# Patient Record
Sex: FEMALE
[De-identification: names, ages, dates, MRNs, and addresses within clinical notes are randomized per-mention and may not be internally consistent; named-entity substitution may affect disease eponyms.]

---

## 2020-11-14 ENCOUNTER — HOSPITAL ENCOUNTER (EMERGENCY)
Dept: HOSPITAL 97 - ER | Age: 45
Discharge: HOME | End: 2020-11-14
Payer: SELF-PAY

## 2020-11-14 VITALS — OXYGEN SATURATION: 99 % | TEMPERATURE: 98.8 F

## 2020-11-14 VITALS — SYSTOLIC BLOOD PRESSURE: 118 MMHG | DIASTOLIC BLOOD PRESSURE: 72 MMHG

## 2020-11-14 DIAGNOSIS — K04.7: Primary | ICD-10-CM

## 2020-11-14 DIAGNOSIS — Z72.0: ICD-10-CM

## 2020-11-14 PROCEDURE — 99283 EMERGENCY DEPT VISIT LOW MDM: CPT

## 2020-11-14 PROCEDURE — 0C94XZZ DRAINAGE OF BUCCAL MUCOSA, EXTERNAL APPROACH: ICD-10-PCS

## 2020-11-14 PROCEDURE — 96365 THER/PROPH/DIAG IV INF INIT: CPT

## 2020-11-14 NOTE — EDPHYS
Physician Documentation                                                                           

 [facility]                                                                 

Name: Slime Ortega                                                                             

Age: 45 yrs                                                                                       

Sex: Female                                                                                       

: 1975                                                                                   

MRN: [id number]                                                                                   

Arrival Date: 2020                                                                          

Time: 14:21                                                                                       

Account#: [id number]                                                                            

Bed 24                                                                                            

Private MD:                                                                                       

ED Physician Yao Turner                                                                         

HPI:                                                                                              

                                                                                             

15:05 This 45 yrs old  Female presents to ER via Ambulatory with complaints of        cp  

      Toothache, Facial Swelling.                                                                 

15:05 The patient presents with pain, swelling. The problem is located in the left lower jaw. cp  

15:05 Onset: The symptoms/episode began/occurred yesterday. Duration: The symptoms are        cp  

      continuous, and are steadily getting worse.                                                 

15:05 Associated signs and symptoms: Pertinent positives: swelling, mandibular, Pertinent     cp  

      negatives: chills, dysphagia, fever, inability to eat, vomiting. Severity of symptoms:      

      in the emergency department the symptoms are unchanged, despite home interventions.         

                                                                                                  

OB/GYN:                                                                                           

14:45 LMP N/A - Post-menopause                                                                jd3 

                                                                                                  

Historical:                                                                                       

- Allergies:                                                                                      

14:45 No Known Allergies;                                                                     jd3 

- Home Meds:                                                                                      

14:45 None [Active];                                                                          jd3 

- PMHx:                                                                                           

14:45 None;                                                                                   jd3 

- PSHx:                                                                                           

14:45 Cholecystectomy;                                                                        jd3 

                                                                                                  

- Immunization history:: Adult Immunizations up to date.                                          

- Social history:: Smoking status: Patient reports the use of cigarette tobacco                   

  products, denies chronic smoking, but will smoke occasionally.                                  

                                                                                                  

                                                                                                  

ROS:                                                                                              

15:10 Constitutional: Negative for body aches, chills, fever, poor PO intake.                 cp  

15:10 Eyes: Negative for injury, pain, redness, and discharge.                                cp  

15:10 ENT: Positive for dental pain, left lower jaw pain, Negative for drainage from ear(s),      

      ear pain, sore throat, difficulty swallowing, difficulty handling secretions.               

15:10 Cardiovascular: Negative for chest pain, palpitations.                                      

15:10 Respiratory: Negative for cough, shortness of breath, wheezing.                             

15:10 Abdomen/GI: Negative for abdominal pain, nausea, vomiting, and diarrhea.                    

15:10 Skin: Negative for rash.                                                                    

15:10 Neuro: Negative for altered mental status, headache, weakness.                              

15:10 All other systems are negative.                                                             

                                                                                                  

Exam:                                                                                             

15:25 Constitutional: The patient appears in no acute distress, alert, awake, non-toxic, well cp  

      developed, well nourished, uncomfortable.                                                   

15:25 Head/face: Noted is no obvious of injury or deformity except swelling, that is mild, of cp  

      the  left jaw, tenderness, that is severe, of the  left jaw.                                

15:25 Eyes: Periorbital structures: appear normal, Conjunctiva: normal, no exudate, no            

      injection, Sclera: no appreciated abnormality, Lids and lashes: appear normal,              

      bilaterally.                                                                                

15:25 ENT: External ear(s): are unremarkable, Nose: is normal, Mouth: Lips: moist, Oral           

      mucosa: pink and intact, moist, Posterior pharynx: Airway: no evidence of obstruction,      

      patent, Dental exam: abscess, that is mild, specifically in the  lower left first molar     

      (#19) and lower left second bicuspid (#20), dental caries, that is severe, specifically     

      in the  lower left first molar (#19) and lower left second bicuspid (#20), fractured        

      teeth are noted, specifically the  lower left first molar (#19) and lower left second       

      bicuspid (#20), pain, that is severe, specifically in the  lower left first molar (#19)     

      and lower left second bicuspid (#20), Voice: is normal, negative trismus.                   

15:25 Neck: ROM/movement: is normal, is supple, without pain, no range of motions                 

      limitations, no nuchal rigidity.                                                            

15:25 Chest/axilla: Inspection: normal, Palpation: is normal, no crepitus, no tenderness.         

15:25 Cardiovascular: Rate: normal.                                                               

15:25 Respiratory: the patient does not display signs of respiratory distress,  Respirations:     

      normal.                                                                                     

                                                                                                  

Vital Signs:                                                                                      

14:45  / 75; Pulse 81; Resp 17 S; Temp 98.8(TE); Pulse Ox 99% on R/A; Pain 10/10;       jd3 

14:48 Weight 55.25 kg (M);                                                                    aa5 

15:35  / 72; Pulse 80; Resp 16 S; Pulse Ox 99% on R/A;                                  aa5 

                                                                                                  

Procedures:                                                                                       

23:50 I \T\ D: Incision and drainage was performed for an abscess of the lower left first molar cp

      (#19) Anesthetized with left mandibular block with 5 ccs of 1% lidocaine w/o epi and        

      0.5% marcaine. Drained small amount purulent fluid. area aspirated with 18 gauge needle.    

                                                                                                  

MDM:                                                                                              

14:48 Patient medically screened.                                                             cp  

16:00 Differential diagnosis: dental caries, gingivitis, dental abscess, pericoronitis.       cp  

16:42 Data reviewed: vital signs, nurses notes, and as a result, I will discharge patient.      

16:42 Counseling: I had a detailed discussion with the patient and/or guardian regarding: the cp  

      historical points, exam findings, and any diagnostic results supporting the                 

      discharge/admit diagnosis, the need for outpatient follow up, for definitive care, a        

      dentist, to return to the emergency department if symptoms worsen or persist or if          

      there are any questions or concerns that arise at home. Response to treatment: the          

      patient's symptoms have markedly improved after treatment, and as a result, I will          

      discharge patient.                                                                          

                                                                                                  

11                                                                                             

15:00 Order name: IV; Complete Time: 15:34                                                      

11                                                                                             

15:00 Order name: I\T\D Setup; Complete Time: 15:34                                             cp

                                                                                                  

Administered Medications:                                                                         

15:34 Drug: Clindamycin 600 mg Route: IVPB; Infused Over: 30 mins; Site: right forearm;       aa5 

16:04 Follow up: Response: No adverse reaction; IV Status: Completed infusion                 aa5 

16:30 Drug: Marcaine (0.5 %) 5 ml {Note: administered for I\T\D by PA.} Volume: 10 ml; Route:   aa5

      Infiltration;                                                                               

16:30 Drug: Lidocaine (1 %) 5 ml {Note: administered for I\T\D by PA.} Volume: 5 ml; Route:     aa5

      Infiltration;                                                                               

                                                                                                  

                                                                                                  

Disposition:                                                                                      

16:50 Chart complete.                                                                           

11/15                                                                                             

07:04 Co-signature as Attending Physician, Yao Turner MD.                                    rn  

                                                                                                  

Disposition:                                                                                      

20 16:43 Discharged to Home. Impression: Other specified disorders of teeth and             

  supporting structures.                                                                          

- Condition is Stable.                                                                            

- Discharge Instructions: Dental Abscess, Dental Pain.                                            

- Prescriptions for Clindamycin HCl 300 mg Oral Capsule - take 1 capsule by ORAL route            

  every 6 hours for 10 days; 40 capsule. Tylenol- Codeine #3 300-30 mg Oral Tablet -              

  take 2 tablets by ORAL route every 6 hours As needed; 20 tablet.                                

- Medication Reconciliation Form, Thank You Letter, Antibiotic Education, Prescription            

  Opioid Use form.                                                                                

- Follow up: Private Physician; When: 1 - 2 days; Reason: Recheck today's complaints.             

- Problem is new.                                                                                 

- Symptoms have improved.                                                                         

                                                                                                  

                                                                                                  

                                                                                                  

Signatures:                                                                                       

Yao Turner MD MD rn Calderon, Audri, RN                     RN   aa5                                                  

Nick Moreira PA PA cp Davies, Jonathon RN                    RN   jd3                                                  

                                                                                                  

Corrections: (The following items were deleted from the chart)                                    

                                                                                             

17:23 16:43 2020 16:43 Discharged to Home. Impression: Other specified disorders of     aa5 

      teeth and supporting structures. Condition is Stable. Forms are Medication                  

      Reconciliation Form, Thank You Letter, Antibiotic Education, Prescription Opioid Use.       

      Follow up: Private Physician; When: 1 - 2 days; Reason: Recheck today's complaints.         

      Problem is new. Symptoms have improved. cp                                                  

                                                                                                  

**************************************************************************************************

## 2020-11-14 NOTE — ER
Nurse's Notes                                                                                     

 [facility]                                                                 

Name: Slime Ortega                                                                             

Age: 45 yrs                                                                                       

Sex: Female                                                                                       

: 1975                                                                                   

MRN: [id number]                                                                                   

Arrival Date: 2020                                                                          

Time: 14:21                                                                                       

Account#: [id number]                                                                            

Bed 24                                                                                            

Private MD:                                                                                       

Diagnosis: Other specified disorders of teeth and supporting structures                           

                                                                                                  

Presentation:                                                                                     

                                                                                             

14:44 Chief complaint: Patient states: "the left side of my face is swollen and hurting.".    jd3 

      Coronavirus screen: At this time, the client does not indicate any symptoms associated      

      with coronavirus-19. Ebola Screen: Patient negative for fever greater than or equal to      

      101.5 degrees Fahrenheit, and additional compatible Ebola Virus Disease symptoms.           

      Initial Sepsis Screen: Does the patient meet any 2 criteria? No. Patient's initial          

      sepsis screen is negative. Does the patient have a suspected source of infection? No.       

      Patient's initial sepsis screen is negative. Risk Assessment: Do you want to hurt           

      yourself or someone else? Patient reports no desire to harm self or others. Onset of        

      symptoms was 2020.                                                             

14:44 Method Of Arrival: Ambulatory                                                           jd3 

14:44 Acuity: RENEE 4                                                                           jd3 

                                                                                                  

OB/GYN:                                                                                           

14:45 LMP N/A - Post-menopause                                                                jd3 

                                                                                                  

Historical:                                                                                       

- Allergies:                                                                                      

14:45 No Known Allergies;                                                                     jd3 

- Home Meds:                                                                                      

14:45 None [Active];                                                                          jd3 

- PMHx:                                                                                           

14:45 None;                                                                                   jd3 

- PSHx:                                                                                           

14:45 Cholecystectomy;                                                                        jd3 

                                                                                                  

- Immunization history:: Adult Immunizations up to date.                                          

- Social history:: Smoking status: Patient reports the use of cigarette tobacco                   

  products, denies chronic smoking, but will smoke occasionally.                                  

                                                                                                  

                                                                                                  

Screenin:50 Abuse screen: Denies threats or abuse. Nutritional screening: No deficits noted.        aa5 

      Tuberculosis screening: No symptoms or risk factors identified. Fall Risk None              

      identified.                                                                                 

                                                                                                  

Assessment:                                                                                       

14:48 General: Appears uncomfortable, Behavior is calm, cooperative. Pain: Complains of pain  aa5 

      in left jaw Pain currently is 10 out of 10 on a pain scale. Quality of pain is              

      described as aching, throbbing, Pain began 1 day ago. Is continuous, Aggravated by          

      eating. Neuro: Level of Consciousness is awake, alert, obeys commands, Oriented to          

      person, place, time, situation. Cardiovascular: Patient's skin is warm and dry.             

      Respiratory: Airway is patent Respiratory effort is even, unlabored, Respiratory            

      pattern is regular, symmetrical. GI: No signs and/or symptoms were reported involving       

      the gastrointestinal system. : No signs and/or symptoms were reported regarding the       

      genitourinary system. EENT: Reports toothache. Derm: Skin is pink, warm \T\ dry.            

      Musculoskeletal: Range of motion: intact in all extremities.                                

15:35 Reassessment: Patient is alert, oriented x 3, equal unlabored respirations, skin        aa5 

      warm/dry/pink. Awaiting I\T\D.                                                              

16:30 Reassessment: Patient is alert, oriented x 3, equal unlabored respirations, skin        aa5 

      warm/dry/pink. PA at bedside for I\T\D.                                                     

17:15 Reassessment: Patient is alert, oriented x 3, equal unlabored respirations, skin        aa5 

      warm/dry/pink.                                                                              

                                                                                                  

Vital Signs:                                                                                      

14:45  / 75; Pulse 81; Resp 17 S; Temp 98.8(TE); Pulse Ox 99% on R/A; Pain 10/10;       jd3 

14:48 Weight 55.25 kg (M);                                                                    aa5 

15:35  / 72; Pulse 80; Resp 16 S; Pulse Ox 99% on R/A;                                  aa5 

                                                                                                  

ED Course:                                                                                        

14:21 Patient arrived in ED.                                                                  as  

14:44 Triage completed.                                                                       jd3 

14:46 Arm band placed on.                                                                     jd3 

14:47 Nick Moreira PA is PHCP.                                                                cp  

14:47 Yao Turner MD is Attending Physician.                                                cp  

14:48 Macarena Briones, RN is Primary Nurse.                                                   aa5 

14:48 Patient has correct armband on for positive identification. Bed in low position. Call   aa5 

      light in reach. Side rails up X 1.                                                          

15:33 Inserted saline lock: 20 gauge in right forearm, using aseptic technique.               aa5 

17:15 No provider procedures requiring assistance completed. IV discontinued, intact,         aa5 

      bleeding controlled, No redness/swelling at site. Pressure dressing applied.                

                                                                                                  

Administered Medications:                                                                         

15:34 Drug: Clindamycin 600 mg Route: IVPB; Infused Over: 30 mins; Site: right forearm;       aa5 

16:04 Follow up: Response: No adverse reaction; IV Status: Completed infusion                 aa5 

16:30 Drug: Marcaine (0.5 %) 5 ml {Note: administered for I\T\D by PA.} Volume: 10 ml; Route:   aa5

      Infiltration;                                                                               

16:30 Drug: Lidocaine (1 %) 5 ml {Note: administered for I\T\D by PA.} Volume: 5 ml; Route:     aa5

      Infiltration;                                                                               

                                                                                                  

                                                                                                  

Outcome:                                                                                          

16:43 Discharge ordered by MD.                                                                [place]  

17:15 Discharged to home ambulatory.                                                          aa5 

17:15 Condition: stable                                                                           

17:15 Discharge instructions given to patient, Instructed on discharge instructions, follow       

      up and referral plans. medication usage, Demonstrated understanding of instructions,        

      follow-up care, medications, Prescriptions given X 2.                                       

17:20 Patient left the ED.                                                                    aa5 

                                                                                                  

Signatures:                                                                                       

Celia Simmons Audri, RN                     RN   aa5                                                  

Nick Moreira PA PA cp Davies, Jonathon, RN                    RN   jd3                                                  

                                                                                                  

Corrections: (The following items were deleted from the chart)                                    

17:51 17:23 Patient left the ED. aa5                                                          aa5 

                                                                                                  

**************************************************************************************************

## 2020-11-15 ENCOUNTER — HOSPITAL ENCOUNTER (EMERGENCY)
Dept: HOSPITAL 97 - ER | Age: 45
LOS: 1 days | Discharge: HOME | End: 2020-11-16
Payer: SELF-PAY

## 2020-11-15 DIAGNOSIS — R07.9: ICD-10-CM

## 2020-11-15 DIAGNOSIS — R10.13: Primary | ICD-10-CM

## 2020-11-15 LAB
ALBUMIN SERPL BCP-MCNC: 3.6 G/DL (ref 3.4–5)
ALP SERPL-CCNC: 94 U/L (ref 45–117)
ALT SERPL W P-5'-P-CCNC: 21 U/L (ref 12–78)
AST SERPL W P-5'-P-CCNC: 30 U/L (ref 15–37)
BUN BLD-MCNC: 11 MG/DL (ref 7–18)
GLUCOSE SERPLBLD-MCNC: 167 MG/DL (ref 74–106)
HCT VFR BLD CALC: 37.3 % (ref 36–45)
INR BLD: 0.97
LIPASE SERPL-CCNC: 161 U/L (ref 73–393)
LYMPHOCYTES # SPEC AUTO: 4.1 K/UL (ref 0.7–4.9)
MAGNESIUM SERPL-MCNC: 2.1 MG/DL (ref 1.8–2.4)
NT-PROBNP SERPL-MCNC: 10 PG/ML (ref ?–125)
PMV BLD: 8.4 FL (ref 7.6–11.3)
POTASSIUM SERPL-SCNC: 3.8 MMOL/L (ref 3.5–5.1)
RBC # BLD: 4.04 M/UL (ref 3.86–4.86)
TROPONIN (EMERG DEPT USE ONLY): < 0.02 NG/ML (ref 0–0.04)

## 2020-11-15 PROCEDURE — 96374 THER/PROPH/DIAG INJ IV PUSH: CPT

## 2020-11-15 PROCEDURE — 85610 PROTHROMBIN TIME: CPT

## 2020-11-15 PROCEDURE — 74177 CT ABD & PELVIS W/CONTRAST: CPT

## 2020-11-15 PROCEDURE — 81003 URINALYSIS AUTO W/O SCOPE: CPT

## 2020-11-15 PROCEDURE — 99284 EMERGENCY DEPT VISIT MOD MDM: CPT

## 2020-11-15 PROCEDURE — 84484 ASSAY OF TROPONIN QUANT: CPT

## 2020-11-15 PROCEDURE — 71275 CT ANGIOGRAPHY CHEST: CPT

## 2020-11-15 PROCEDURE — 71045 X-RAY EXAM CHEST 1 VIEW: CPT

## 2020-11-15 PROCEDURE — 83735 ASSAY OF MAGNESIUM: CPT

## 2020-11-15 PROCEDURE — 83880 ASSAY OF NATRIURETIC PEPTIDE: CPT

## 2020-11-15 PROCEDURE — 82550 ASSAY OF CK (CPK): CPT

## 2020-11-15 PROCEDURE — 82947 ASSAY GLUCOSE BLOOD QUANT: CPT

## 2020-11-15 PROCEDURE — 85025 COMPLETE CBC W/AUTO DIFF WBC: CPT

## 2020-11-15 PROCEDURE — 80320 DRUG SCREEN QUANTALCOHOLS: CPT

## 2020-11-15 PROCEDURE — 80076 HEPATIC FUNCTION PANEL: CPT

## 2020-11-15 PROCEDURE — 81025 URINE PREGNANCY TEST: CPT

## 2020-11-15 PROCEDURE — 70450 CT HEAD/BRAIN W/O DYE: CPT

## 2020-11-15 PROCEDURE — 96361 HYDRATE IV INFUSION ADD-ON: CPT

## 2020-11-15 PROCEDURE — 36415 COLL VENOUS BLD VENIPUNCTURE: CPT

## 2020-11-15 PROCEDURE — 83690 ASSAY OF LIPASE: CPT

## 2020-11-15 PROCEDURE — 93005 ELECTROCARDIOGRAM TRACING: CPT

## 2020-11-15 PROCEDURE — 80048 BASIC METABOLIC PNL TOTAL CA: CPT

## 2020-11-15 PROCEDURE — 80307 DRUG TEST PRSMV CHEM ANLYZR: CPT

## 2020-11-16 VITALS — SYSTOLIC BLOOD PRESSURE: 97 MMHG | OXYGEN SATURATION: 99 % | DIASTOLIC BLOOD PRESSURE: 64 MMHG

## 2020-11-16 VITALS — TEMPERATURE: 97.9 F

## 2020-11-16 LAB
METHAMPHET UR QL SCN: NEGATIVE
THC SERPL-MCNC: NEGATIVE NG/ML

## 2020-11-16 NOTE — RAD REPORT
EXAM DESCRIPTION:  CT - Head Brain Wo Cont - 11/16/2020 3:38 am

 

CLINICAL HISTORY:  SYNCOPE

 

COMPARISON:  None.

 

TECHNIQUE:  CT HEAD WITHOUT IV CONTRAST on 11/15/2020 10:38 PM CST

This exam was performed according to our departmental dose-optimization program, which includes autom
ated exposure control, adjustment of the mA and/or kV according to patient size and/or use of iterati
ve reconstruction technique.

 

FINDINGS:  There is no acute hemorrhage, mass effect or midline shift. Gray-white differentiation is 
preserved. There is no hydrocephalus. There is no significant volume loss for age.

The calvarium is intact. Orbits and globes are unremarkable. The paranasal sinuses are clear. Mastoid
 air cells are clear.

 

IMPRESSION:  No acute intracranial findings.

 

Electronically signed by:   Jared Birmingham MD   11/16/2020 12:06 AM CST Workstation: [id number]

 

 

 

Due to temporary technical issues with the PACS/Fluency reporting system, reports are being signed by
 the in house radiologist without review as a courtesy to ensure prompt reporting. The interpreting r
adiologist is fully responsible for the content of the report.

## 2020-11-16 NOTE — RAD REPORT
EXAM DESCRIPTION:  Rajat Single View11/15/2020 11:15 pm

 

CLINICAL HISTORY:  Chest pain

 

COMPARISON:  2017

 

FINDINGS:   The lungs appear clear of acute infiltrate. The heart is normal size

 

IMPRESSION:   No acute abnormalities displayed

## 2020-11-16 NOTE — ER
Nurse's Notes                                                                                     

 [facility]                                                                 

Name: Slime Ortega                                                                             

Age: 45 yrs                                                                                       

Sex: Female                                                                                       

: 1975                                                                                   

MRN: [id number]                                                                                   

Arrival Date: 11/15/2020                                                                          

Time: 21:41                                                                                       

Account#: [id number]                                                                            

Bed 4                                                                                             

Private MD:                                                                                       

Diagnosis: Near Syncope;Abdominal Pain;Chest Pain                                                 

                                                                                                  

Presentation:                                                                                     

11/15                                                                                             

21:41 Chief complaint: Patient's son or daughter states: She was at home with some              

      family/friends and they said she almost passed out for few minutes, just wasn't able to     

      stand. She is really acting normal and she just says that shes having pain, abdominal       

      pain and near her chest is having pain, is what she says. Coronavirus screen: Client        

      denies travel out of the [place] in the last 14 days. At this time, the client does not        

      indicate any symptoms associated with coronavirus-19. Ebola Screen: Patient negative        

      for fever greater than or equal to 101.5 degrees Fahrenheit, and additional compatible      

      Ebola Virus Disease symptoms Patient denies exposure to infectious person. Patient          

      denies travel to an Ebola-affected area in the 21 days before illness onset. No             

      symptoms or risks identified at this time. Initial Sepsis Screen: Does the patient meet     

      any 2 criteria? No. Patient's initial sepsis screen is negative. Does the patient have      

      a suspected source of infection? No. Patient's initial sepsis screen is negative. Risk      

      Assessment: Do you want to hurt yourself or someone else? Patient reports no desire to      

      harm self or others. Onset of symptoms was November 15, 2020. Care prior to arrival:        

      None. Transition of care: patient was not received from another setting of care.            

21:41 Acuity: RENEE 3                                                                           sg  

21:41 Method Of Arrival: Wheelchair                                                           sg  

                                                                                                  

OB/GYN:                                                                                           

21:49 LMP N/A - Post-menopause                                                                lp1 

                                                                                                  

Historical:                                                                                       

- Allergies:                                                                                      

21:41 No Known Allergies;                                                                     sg  

- Home Meds:                                                                                      

21:49 None [Active];                                                                          lp1 

- PMHx:                                                                                           

21:46 None;                                                                                   sg  

- PSHx:                                                                                           

21:41 Cholecystectomy;                                                                        sg  

                                                                                                  

- Immunization history:: Adult Immunizations up to date.                                          

- Social history:: Smoking status: Patient denies any tobacco usage or history of.                

                                                                                                  

                                                                                                  

Screenin:49 Abuse screen: Denies threats or abuse. Denies injuries from another. Nutritional        lp1 

      screening: No deficits noted. Tuberculosis screening: No symptoms or risk factors           

      identified.                                                                                 

21:51 Fall Risk IV access (20 points).                                                        mg2 

                                                                                                  

Assessment:                                                                                       

21:46 General: Appears uncomfortable, Behavior is anxious, inappropriate for age. Pain:       lp1 

      Complains of pain in abdomen Pain currently is 10 out of 10 on a pain scale. Noted to       

      be crying, grimacing, guarding, moaning. Neuro: Level of Consciousness is obeys             

      commands, Oriented to person, place, Patient keeping eyes closed, hyperventilating,         

      answering questions by nodding head. Cardiovascular: Patient's skin is warm and dry.        

      Respiratory: Respiratory effort is even, Respiratory pattern is hyperventilation. GI:       

      Abdomen is non-distended, Abdomen is tender to palpation X 4 quads. Guarding noted X 4      

      quads. : No signs and/or symptoms were reported regarding the genitourinary system.       

      EENT: No signs and/or symptoms were reported regarding the EENT system. Derm: Skin is       

      pink, warm \T\ dry. Musculoskeletal: No deficits noted.                                     

22:00 Reassessment: Verbal order from provider for Ativan 0.5mg IV now; Patient noted to be   lp1 

      moaning, reports pain to abdomen, hyperventilating.                                         

22:37 Reassessment: Patient appears in no apparent distress at this time. Patient resting,    lp1 

      eyes closed, respirations unlabored.                                                        

23:17 Reassessment: patient sent to ct scan via stretcher.                                    mg2 

                                                                                             

01:22 Reassessment: Patient appears in no apparent distress at this time. Patient and/or      mg2 

      family updated on plan of care and expected duration. Pain level reassessed. Patient is     

      alert, oriented x 3, equal unlabored respirations, skin warm/dry/pink.                      

02:12 Reassessment: Patient is alert, oriented x 3, equal unlabored respirations, skin        mg2 

      warm/dry/pink.                                                                              

                                                                                                  

Vital Signs:                                                                                      

11/15                                                                                             

21:46  / 87; Pulse 96; Resp 24; Temp 97.9(TE); Pulse Ox 98% on R/A; Weight 54.88 kg     lp1 

      (R); Pain 10/10;                                                                            

22:30  / 78; Pulse 73; Resp 17; Pulse Ox 100% on R/A;                                   lp1 

23:30  / 76; Pulse 81; Resp 20; Pulse Ox 100% on R/A;                                   lp1 

                                                                                             

01:22  / 62; Pulse 72; Resp 18; Pulse Ox 97% on R/A;                                    mg2 

01:49 BP 97 / 64; Pulse 68; Resp 18; Pulse Ox 99% on R/A;                                     mg2 

                                                                                                  

ED Course:                                                                                        

11/15                                                                                             

21:41 Patient arrived in ED.                                                                  sg  

21:42 Js Balderrama MD is Attending Physician.                                             7 

21:42 Arm band placed on.                                                                     sg  

21:43 Triage completed.                                                                       sg  

21:44 Yolanda Ames, RN is Primary Nurse.                                                       lp1 

21:48 Patient has correct armband on for positive identification. Placed in gown. Side rails  lp1 

      up X2. Cardiac monitor on. Pulse ox on. NIBP on.                                            

21:49 Inserted saline lock: 20 gauge in right antecubital area, using aseptic technique. By   lp1 

      ISIS Hurtado tech.                                                                           

21:50 No provider procedures requiring assistance completed. EKG done, by ED staff, reviewed  mg2 

      by Js Balderrama MD.                                                                       

23:15 XRAY Chest (1 view) In Process Unspecified.                                             EDMS

23:55 CT Head Brain wo Cont In Process Unspecified.                                           EDMS

23:55 CT Chest For PE Angio In Process Unspecified.                                           EDMS

23:55 CT Abd/Pelvis - IV Contrast Only In Process Unspecified.                                EDMS

11                                                                                             

01:22 Door closed. Warm blanket given. Assisted with bedpan.                                  mg2 

02:12 IV discontinued, intact, bleeding controlled, No redness/swelling at site. Pressure     mg2 

      dressing applied.                                                                           

                                                                                                  

Administered Medications:                                                                         

11/15                                                                                             

21:50 Drug: NS 0.9% 1000 ml Route: IV; Rate: 1000 ml; Site: right antecubital;                mg2 

                                                                                             

01:22 Follow up: Response: No adverse reaction; IV Status: Completed infusion; IV Intake:     mg2 

      1000ml                                                                                      

11/15                                                                                             

22:10 Drug: Ativan 0.5 mg Route: IVP; Site: right antecubital;                                lp1 

22:37 Follow up: Response: Marked relief of symptoms; Anxiety decreased                       lp1 

                                                                                                  

                                                                                                  

Intake:                                                                                           

                                                                                             

01:22 IV: 1000ml; Total: 1000ml.                                                              mg2 

                                                                                                  

Outcome:                                                                                          

01:52 Discharge ordered by MD.                                                                mh7 

02:13 Discharged to home via wheelchair, with family.                                         mg2 

02:13 Condition: stable                                                                           

02:13 Discharge instructions given to patient, family, Instructed on discharge instructions,      

      follow up and referral plans. Demonstrated understanding of instructions, follow-up         

      care.                                                                                       

02:13 Patient left the ED.                                                                    mg2 

                                                                                                  

Signatures:                                                                                       

Dispatcher [employer]                           EDJosh Lehman RN                         RN   sg                                                   

Yolanda Ames RN RN   lp1                                                  

Min Masters RN                    RN   mg2                                                  

Js Balderrama MD MD   mh7                                                  

                                                                                                  

Corrections: (The following items were deleted from the chart)                                    

11/15                                                                                             

21:44 21:41 Chief complaint: Patient's son or daughter states: She was at home with some      sg  

      family/friends and they said she passed out for few minutes. She is really acting           

      normal and she just says that shes having pain, abdominal pain is what she says sg          

                                                                                                  

**************************************************************************************************

## 2020-11-16 NOTE — EDPHYS
Physician Documentation                                                                           

 [facility]                                                                 

Name: Slime Ortega                                                                             

Age: 45 yrs                                                                                       

Sex: Female                                                                                       

: 1975                                                                                   

MRN: [id number]                                                                                   

Arrival Date: 11/15/2020                                                                          

Time: 21:41                                                                                       

Account#: [id number]                                                                            

Bed 4                                                                                             

Private MD:                                                                                       

ED Physician Js Balderrama                                                                      

HPI:                                                                                              

11/15                                                                                             

22:19 This 45 yrs old  Female presents to ER via Wheelchair with complaints of Near   mh7 

      Syncope, Epigastric Pain.                                                                   

22:19 The patient has experienced near-syncope, almost passed out. Onset: The                 mh7 

      symptoms/episode began/occurred today.                                                      

22:20 Onset: The symptoms/episode began/occurred just prior to arrival. Duration: This was a  mh7 

      single episode. Context: the episode(s) was witnessed, by family, son, occurred at          

      home, occurred while the patient was.                                                       

22:21 Context: occurred while the patient was standing, Just prior to the episode the patient mh7 

      experienced no apparent symptoms. Associated injury: The patient did not suffer any         

      apparent associated injury. Associated signs and symptoms: Pertinent positives:             

      abdominal pain, chest pain, shortness of breath, Pertinent negatives: confusion,            

      diaphoresis, diarrhea, dizziness, headache, lightheadedness, nausea, numbness,              

      palpitations, seizure, tingling, vertigo, vomiting, weakness. Current symptoms: Anxious.    

                                                                                                  

OB/GYN:                                                                                           

21:49 LMP N/A - Post-menopause                                                                lp1 

                                                                                                  

Historical:                                                                                       

- Allergies:                                                                                      

21:41 No Known Allergies;                                                                     sg  

- Home Meds:                                                                                      

21:49 None [Active];                                                                          lp1 

- PMHx:                                                                                           

21:46 None;                                                                                   sg  

- PSHx:                                                                                           

21:41 Cholecystectomy;                                                                        sg  

                                                                                                  

- Immunization history:: Adult Immunizations up to date.                                          

- Social history:: Smoking status: Patient denies any tobacco usage or history of.                

                                                                                                  

                                                                                                  

ROS:                                                                                              

22:21 Constitutional: Negative for fever, chills, and weight loss, Eyes: Negative for injury, mh7 

      pain, redness, and discharge, ENT: Negative for injury, pain, and discharge, Neck:          

      Negative for injury, pain, and swelling, Back: Negative for injury and pain, :            

      Negative for injury, bleeding, discharge, and swelling, MS/Extremity: Negative for          

      injury and deformity, Skin: Negative for injury, rash, and discoloration,                   

      Allergy/Immunology: Negative for hives, rash, and allergies, Endocrine: Negative for        

      neck swelling, polydipsia, polyuria, polyphagia, and marked weight changes,                 

      Hematologic/Lymphatic: Negative for swollen nodes, abnormal bleeding, and unusual           

      bruising.                                                                                   

                                                                                                  

Exam:                                                                                             

22:35 Head/Face:  Normocephalic, atraumatic. Eyes:  Pupils equal round and reactive to light, mh7 

      extra-ocular motions intact.  Lids and lashes normal.  Conjunctiva and sclera are           

      non-icteric and not injected.  Cornea within normal limits.  Periorbital areas with no      

      swelling, redness, or edema. Neck:  Trachea midline, no thyromegaly or masses palpated,     

      and no cervical lymphadenopathy.  Supple, full range of motion without nuchal rigidity,     

      or vertebral point tenderness.  No Meningismus. Chest/axilla:  Normal chest wall            

      appearance and motion.  Nontender with no deformity.  No lesions are appreciated.           

      Cardiovascular:  Regular rate and rhythm with a normal S1 and S2.  No gallops, murmurs,     

      or rubs.  Normal PMI, no JVD.  No pulse deficits. Respiratory:  Lungs have equal breath     

      sounds bilaterally, clear to auscultation and percussion.  No rales, rhonchi or wheezes     

      noted.  No increased work of breathing, no retractions or nasal flaring.                    

22:35 Back:  No spinal tenderness.  No costovertebral tenderness.  Full range of motion.          

      Skin:  Warm, dry with normal turgor.  Normal color with no rashes, no lesions, and no       

      evidence of cellulitis. MS/ Extremity:  Pulses equal, no cyanosis.  Neurovascular           

      intact.  Full, normal range of motion. Neuro:  Awake and alert, GCS 15, oriented to         

      person, place, time, and situation.  Cranial nerves II-XII grossly intact.  Motor           

      strength 5/5 in all extremities.  Sensory grossly intact.  Cerebellar exam normal.          

      Normal gait.                                                                                

22:35 Constitutional: The patient appears in no acute distress, alert, awake, anxious,            

      uncomfortable.                                                                              

22:35 Abdomen/GI: Inspection: abdomen appears normal, Bowel sounds: normal, in all quadrants,     

      Palpation: moderate abdominal tenderness, in the epigastric area, Rectal exam: the exam     

      is deferred, because of patient request, Indicators: McBurney's point is not tender,        

      Yanez's sign is negative, Rovsing's sign is negative, Obturator sign is negative,          

      Psoas sign is negative, Liver: no appreciated palpable abnormalities, Hernia: not           

      appreciated.                                                                                

22:35 Psych:                                                                                      

22:35 Psych: Behavior/mood is cooperative, anxious, Affect is animated, Oriented to person,       

      place, time, Patient has no thoughts/intents to harm self or others. Judgement /            

      Insight is normal. Memory is normal. Delusions/hallucinations are not present.              

                                                                                                  

Vital Signs:                                                                                      

21:46  / 87; Pulse 96; Resp 24; Temp 97.9(TE); Pulse Ox 98% on R/A; Weight 54.88 kg     lp1 

      (R); Pain 10/10;                                                                            

22:30  / 78; Pulse 73; Resp 17; Pulse Ox 100% on R/A;                                   lp1 

23:30  / 76; Pulse 81; Resp 20; Pulse Ox 100% on R/A;                                   lp1 

11                                                                                             

01:22  / 62; Pulse 72; Resp 18; Pulse Ox 97% on R/A;                                    mg2 

01:49 BP 97 / 64; Pulse 68; Resp 18; Pulse Ox 99% on R/A;                                     mg2 

                                                                                                  

MDM:                                                                                              

01:49 Differential Diagnosis: aortic aneurysm, cardiac arrhythmia, cerebrovascular accident,  mh7 

      drug effect, emotional response, idiopathic syncope, pseudo seizure, seizure, vasovagal     

      episode. Data reviewed: vital signs, nurses notes, lab test result(s), cardiac enzymes,     

      CBC, drug level(s), electrolytes, urinalysis, EKG, radiologic studies, CT scan. Data        

      interpreted: Pulse oximetry: on room air is 99 %. Interpretation: normal. Counseling: I     

      had a detailed discussion with the patient and/or guardian regarding: the historical        

      points, exam findings, and any diagnostic results supporting the discharge/admit            

      diagnosis, lab results, radiology results, the need for outpatient follow up, to return     

      to the emergency department if symptoms worsen or persist or if there are any questions     

      or concerns that arise at home. Response to treatment: the patient's symptoms have          

      resolved after treatment, the patient's blood pressure is in an acceptable range,           

      mental status has returned to baseline, the patient no longer shows bradycardia, the        

      patient is not short of breath, the patient is not tachycardic, the patient's pain is       

      gone, the patient's temperature has normalized.                                             

01:52 Patient medically screened.                                                             mh7 

                                                                                                  

11/15                                                                                             

21:42 Order name: Basic Metabolic Panel                                                       kb  

11/15                                                                                             

21:42 Order name: CBC with Diff                                                               kb  

11/15                                                                                             

21:42 Order name: LFT's                                                                         

11/15                                                                                             

21:42 Order name: Magnesium                                                                   kb  

11/15                                                                                             

21:42 Order name: NT PRO-BNP; Complete Time: 22:37                                            kb  

11/15                                                                                             

21:42 Order name: PT-INR; Complete Time: 22:18                                                kb  

11/15                                                                                             

21:42 Order name: Troponin (emerg Dept Use Only); Complete Time: 22:37                        kb  

11/15                                                                                             

21:43 Order name: Basic Metabolic Panel; Complete Time: 22:37                                 EDMS

11/15                                                                                             

21:43 Order name: CBC with Automated Diff; Complete Time: 22:18                               EDMS

11/15                                                                                             

21:43 Order name: Liver (Hepatic) Function; Complete Time: 22:37                              EDMS

11/15                                                                                             

21:43 Order name: Magnesium; Complete Time: 22:37                                             EDMS

11/15                                                                                             

21:45 Order name: FSBG - FOR PT WITH NO ID                                                    sg  

11/15                                                                                             

21:45 Order name: Glucose, Ancillary(No Armband); Complete Time: 22:18                        EDMS

11/15                                                                                             

21:49 Order name: UDS                                                                         [facility] 

11/15                                                                                             

21:42 Order name: XRAY Chest (1 view)                                                           

11/15                                                                                             

21:42 Order name: EKG; Complete Time: 21:44                                                   kb  

11/15                                                                                             

21:42 Order name: Cardiac monitoring; Complete Time: 21:50                                    kb  

11/15                                                                                             

21:42 Order name: EKG - Nurse/Tech; Complete Time: 21:50                                      kb  

11/15                                                                                             

21:51 Order name: ETOH Level; Complete Time: 22:18                                            [facility] 

11/15                                                                                             

21:53 Order name: Creatine Phosphokinase; Complete Time: 22:37                                EDMS

11/15                                                                                             

21:53 Order name: Lipase; Complete Time: 22:37                                                EDMS

11/15                                                                                             

22:38 Order name: CT Head Brain wo Cont                                                       [facility] 

11/15                                                                                             

22:38 Order name: CT Chest For PE Angio                                                       [facility] 

11/15                                                                                             

22:38 Order name: CT Abd/Pelvis - IV Contrast Only                                            [facility] 

                                                                                             

01:29 Order name: Urine Pregnancy--Ancillary (enter results); Complete Time: 01:47            tt3 

                                                                                             

01:29 Order name: Urine Dipstick--Ancillary (enter results); Complete Time: 01:47             tt3 

11/15                                                                                             

21:42 Order name: IV Saline Lock; Complete Time: 21:50                                        kb  

11/15                                                                                             

21:42 Order name: Labs collected and sent; Complete Time: 21:50                               kb  

11/15                                                                                             

21:42 Order name: O2 Per Protocol; Complete Time: 21:50                                       kb  

11/15                                                                                             

21:42 Order name: O2 Sat Monitoring; Complete Time: 21:50                                     kb  

11/15                                                                                             

21:49 Order name: Urine Dipstick-Ancillary (obtain specimen); Complete Time: 01:23            [facility] 

11/15                                                                                             

21:50 Order name: Urine Pregnancy Test (obtain specimen); Complete Time: 01:23                [facility] 

                                                                                             

01:48 Interpretation: Abnormal.                                                               [facility] 

                                                                                                  

Administered Medications:                                                                         

11/15                                                                                             

21:50 Drug: NS 0.9% 1000 ml Route: IV; Rate: 1000 ml; Site: right antecubital;                mg2 

                                                                                             

01:22 Follow up: Response: No adverse reaction; IV Status: Completed infusion; IV Intake:     mg2 

      1000ml                                                                                      

11/15                                                                                             

22:10 Drug: Ativan 0.5 mg Route: IVP; Site: right antecubital;                                lp1 

22:37 Follow up: Response: Marked relief of symptoms; Anxiety decreased                       lp1 

                                                                                                  

                                                                                                  

Disposition:                                                                                      

20 01:52 Discharged to Home. Impression: Near Syncope, Abdominal Pain, Chest Pain.          

- Condition is Stable.                                                                            

- Discharge Instructions: Near-Syncope, Easy-to-Read, Abdominal Pain, Adult,                      

  Easy-to-Read, Nonspecific Chest Pain, Easy-to-Read.                                             

                                                                                                  

- Medication Reconciliation Form, Thank You Letter, Antibiotic Education, Prescription            

  Opioid Use form.                                                                                

- Follow up: Private Physician; When: 1 - 2 days; Reason: Worsening of condition,                 

  Recheck today's complaints, Continuance of care, Re-evaluation by your physician.               

- Problem is new.                                                                                 

- Symptoms have improved.                                                                         

                                                                                                  

                                                                                                  

                                                                                                  

Signatures:                                                                                       

Dispatcher [employer]                           [facility]                                                 

Franca Vasquez, FNP-C                 FNP-Ckb                                                   

Josh Moran RN                         RN   sg                                                   

Yolanda Ames RN                         RN   lp1                                                  

Min Masters RN                    RN   mg2                                                  

Js Balderrama MD MD   7                                                  

                                                                                                  

Corrections: (The following items were deleted from the chart)                                    

21:53 21:50 LIPASE+C.LAB.BRZ ordered. [facility]

21:53 21:52 CREATINE PHOSPHOKINASE+C.LAB.BRZ ordered. [facility]

                                                                                             

02:13 01:52 2020 01:52 Discharged to Home. Impression: Near Syncope; Abdominal Pain;    mg2 

      Chest Pain. Condition is Stable. Forms are Medication Reconciliation Form, Thank You        

      Letter, Antibiotic Education, Prescription Opioid Use. Follow up: Private Physician;        

      When: 1 - 2 days; Reason: Worsening of condition, Recheck today's complaints,               

      Continuance of care, Re-evaluation by your physician. Problem is new. Symptoms have         

      improved. mh7                                                                               

                                                                                                  

**************************************************************************************************

## 2020-11-16 NOTE — RAD REPORT
EXAM DESCRIPTION:  CT - Abdomen   Pelvis W Contrast - 11/16/2020 3:39 am

 

CLINICAL HISTORY:  ABD PAIN

 

COMPARISON:  None.

 

TECHNIQUE:  CT ABDOMEN PELVIS WITH IV CONTRAST on 11/15/2020 10:38 PM CST

This exam was performed according to our departmental dose-optimization program, which includes autom
ated exposure control, adjustment of the mA and/or kV according to patient size and/or use of iterati
ve reconstruction technique.

 

FINDINGS:  Lower lungs are clear.

Abdomen: The liver is normal in appearance. There is no biliary dilatation. Cholecystectomy was perfo
rmed. The pancreas and spleen are normal in appearance. The adrenal glands and kidneys are unremarkab
le.

Abdominal aorta is normal in course and caliber without aneurysm. There is no free air. There is no r
etroperitoneal adenopathy.

Pelvis: There is large amount of stool throughout the colon. Urinary bladder is unremarkable. There i
s no free fluid. Uterus is normal in size. Appendix is normal.

Skeleton: There are no acute osseous findings. No suspicious bony lesions.

 

IMPRESSION:  No definite acute process.

 

Electronically signed by:   Jared Birmingham MD   11/16/2020 12:08 AM CST Workstation: [id number]

 

 

 

Due to temporary technical issues with the PACS/Fluency reporting system, reports are being signed by
 the in house radiologist without review as a courtesy to ensure prompt reporting. The interpreting r
adiologist is fully responsible for the content of the report.

## 2020-11-16 NOTE — RAD REPORT
EXAM DESCRIPTION:  CT - Chest For Pe Angio - 11/16/2020 3:38 am

 

CLINICAL HISTORY:  CHEST PAIN

 

COMPARISON:  None.

 

TECHNIQUE:  CT CHEST ANGIOGRAPHY WITH IV CONTRAST on 11/15/2020 10:38 PM CST. MIPS reconstructions we
re generated.

This exam was performed according to our departmental dose-optimization program, which includes autom
ated exposure control, adjustment of the mA and/or kV according to patient size and/or use of iterati
ve reconstruction technique.   MIP images were generated.

 

FINDINGS:  Thoracic aorta is normal in course and caliber without aneurysm or dissection. Pulmonary a
rteries are adequately opacified without acute or chronic filling defects.

The heart is normal in size. There is no pericardial effusion. Intrathoracic lymph nodes are not enla
rged.

There is no pleural effusion, pleural thickening or pneumothorax. Central airways are patent. Lungs a
re clear with no consolidation, mass or interstitial lung disease.

There are no acute abnormalities within the limited images of the upper abdomen.   There are no acute
 osseous findings. No suspicious bony lesions.

 

IMPRESSION:  No aortic dissection or aneurysm. No pulmonary embolus. No pneumonia.

 

Electronically signed by:   Jared Birmingham MD   11/16/2020 12:07 AM CST Workstation: [id number]

 

 

Due to temporary technical issues with the PACS/Fluency reporting system, reports are being signed by
 the in house radiologist without review as a courtesy to ensure prompt reporting. The interpreting r
adiologist is fully responsible for the content of the report.

## 2023-01-29 ENCOUNTER — HOSPITAL ENCOUNTER (EMERGENCY)
Dept: HOSPITAL 97 - ER | Age: 48
Discharge: HOME | End: 2023-01-29
Payer: SELF-PAY

## 2023-01-29 VITALS — SYSTOLIC BLOOD PRESSURE: 95 MMHG | OXYGEN SATURATION: 99 % | DIASTOLIC BLOOD PRESSURE: 68 MMHG | TEMPERATURE: 98.2 F

## 2023-01-29 DIAGNOSIS — N13.2: Primary | ICD-10-CM

## 2023-01-29 LAB
ALBUMIN SERPL BCP-MCNC: 4 G/DL (ref 3.4–5)
ALP SERPL-CCNC: 107 U/L (ref 45–117)
ALT SERPL W P-5'-P-CCNC: 24 U/L (ref 13–56)
AST SERPL W P-5'-P-CCNC: 16 U/L (ref 15–37)
BUN BLD-MCNC: 6 MG/DL (ref 7–18)
GLUCOSE SERPLBLD-MCNC: 109 MG/DL (ref 74–106)
HCT VFR BLD CALC: 39.1 % (ref 36–45)
LYMPHOCYTES # SPEC AUTO: 2.9 K/UL (ref 0.7–4.9)
MCV RBC: 92.8 FL (ref 80–100)
PMV BLD: 7.8 FL (ref 7.6–11.3)
POTASSIUM SERPL-SCNC: 3.6 MMOL/L (ref 3.5–5.1)
RBC # BLD: 4.22 M/UL (ref 3.86–4.86)
SP GR UR: 1.02 (ref 1–1.03)

## 2023-01-29 PROCEDURE — 74177 CT ABD & PELVIS W/CONTRAST: CPT

## 2023-01-29 PROCEDURE — 96365 THER/PROPH/DIAG IV INF INIT: CPT

## 2023-01-29 PROCEDURE — 87088 URINE BACTERIA CULTURE: CPT

## 2023-01-29 PROCEDURE — 87086 URINE CULTURE/COLONY COUNT: CPT

## 2023-01-29 PROCEDURE — 96375 TX/PRO/DX INJ NEW DRUG ADDON: CPT

## 2023-01-29 PROCEDURE — 36415 COLL VENOUS BLD VENIPUNCTURE: CPT

## 2023-01-29 PROCEDURE — 80053 COMPREHEN METABOLIC PANEL: CPT

## 2023-01-29 PROCEDURE — 87186 SC STD MICRODIL/AGAR DIL: CPT

## 2023-01-29 PROCEDURE — 99284 EMERGENCY DEPT VISIT MOD MDM: CPT

## 2023-01-29 PROCEDURE — 81003 URINALYSIS AUTO W/O SCOPE: CPT

## 2023-01-29 PROCEDURE — 85025 COMPLETE CBC W/AUTO DIFF WBC: CPT

## 2023-01-29 PROCEDURE — 87077 CULTURE AEROBIC IDENTIFY: CPT

## 2023-01-29 PROCEDURE — 76830 TRANSVAGINAL US NON-OB: CPT

## 2023-01-29 PROCEDURE — 81025 URINE PREGNANCY TEST: CPT

## 2023-01-29 NOTE — RAD REPORT
EXAM DESCRIPTION:  CTAbdomen   Pelvis W Contrast - 1/29/2023 6:54 am

 

CLINICAL HISTORY:

LLQ abdominal pain

 

COMPARISON:  11/15/2020

 

TECHNIQUE:  CT of the abdomen and pelvis was performed with IV contrast.

 

All CT scans are performed using dose optimization technique as appropriate and may include automated
 exposure control or mA/KV adjustment according to patient size.

 

FINDINGS:  Lower chest: No acute abnormality.

Liver: No acute abnormality or suspicious lesions.

Biliary: Cholecystectomy

Stomach: No significant focal abnormality.

Duodenum: No significant focal abnormality.

Pancreas: No significant abnormality.

Spleen: No significant abnormality.

Adrenal: No suspicious lesions.

Kidney/ureter: 3 mm stone in the distal left ureter. Mild left-sided hydroureteronephrosis. Hyperdens
ity distal to the stone in the ureter at the left UVJ is of uncertain etiology, possibly some associa
[place] hemorrhage. . Punctate stones in the kidneys bilaterally.

Retroperitoneum: No retroperitoneal adenopathy.

Vascular: No aneurysm.

Bowel: Normal appendix..

Peritoneum: No ascites or free air.

Bladder: Grossly unremarkable.

Reproductive: No adnexal masses.

Bones: No acute fracture.

Other: n/a

 

IMPRESSION:  3 mm stone in the left distal ureter with mild left-sided hydroureteronephrosis. The sto
ne is several cm proximal to the UVJ. Distal to the stone in the left ureter, hyperdense contents are
 present within the left ureter. This could represent some mild associated hemorrhage or inflammation
. Consider outpatient urologic follow-up.

## 2023-01-29 NOTE — EDPHYS
Physician Documentation                                                                           

 [facility]                                                                 

Name: Slime Ortega                                                                             

Age: 47 yrs                                                                                       

Sex: Female                                                                                       

: 1975                                                                                   

MRN: [id number]                                                                                   

Arrival Date: 2023                                                                          

Time: 03:18                                                                                       

Account#: [id number]                                                                            

Bed 18                                                                                            

Private MD:                                                                                       

[facility] Physician Nick Tracey                                                                      

HPI:                                                                                              

                                                                                             

03:22 This 47 yrs old  Female presents to ER via Un[facility]igned with complaints of Pelvic ms3 

      Pain, Low Back Pain.                                                                        

03:33 47-year-old female with no past medical history presents for left-sided pelvic pain     ms3 

      that began after getting off work tonight. Patient states the discomfort is pressure,       

      rated 9/10. Patient denies dysuria or fever. Patient endorses frequency, urgency,           

      chills. Patient denies alleviating or inciting factors..                                    

                                                                                                  

OB/GYN:                                                                                           

03:34 LMP N/A - Post-menopause                                                                vc1 

                                                                                                  

Historical:                                                                                       

- Allergies:                                                                                      

03:34 No Known Allergies;                                                                     vc1 

- Home Meds:                                                                                      

03:34 None [Active];                                                                          vc1 

- PMHx:                                                                                           

03:34 None;                                                                                   vc1 

- PSHx:                                                                                           

03:34 None;                                                                                   vc1 

                                                                                                  

- Immunization history:: Client reports having NOT received the Covid vaccine.                    

- Social history:: Smoking status: Patient denies any tobacco usage or history of.                

                                                                                                  

                                                                                                  

ROS:                                                                                              

03:33 Eyes: Negative for injury, pain, redness, and discharge, Neck: Negative for injury,     ms3 

      pain, and swelling, Cardiovascular: Negative for chest pain, and palpitations.              

      Respiratory: Negative for shortness of breath, cough, wheezing, and pleuritic chest         

      pain.                                                                                       

03:33 MS/Extremity: Negative for injury and deformity, Skin: Negative for injury, rash, and       

      discoloration.                                                                              

03:33 Constitutional: Positive for chills.                                                        

03:33 Abdomen/GI: Positive for                                                                    

03:33 : Positive for urinary frequency, small amounts, difficulty urinating, Negative for       

      burning with urination.                                                                     

03:33 All other systems are negative.                                                             

                                                                                                  

Exam:                                                                                             

03:33 Constitutional:  This is a well developed, well nourished patient who is awake, alert,  ms3 

      and in no acute distress. Neck:  Trachea midline, no cervical lymphadenopathy.  Supple,     

      full range of motion without nuchal rigidity, or vertebral point tenderness.  No            

      Meningismus. Chest/axilla:  Normal chest wall appearance and motion.  Nontender with no     

      deformity.   Cardiovascular:  Regular rate and rhythm with a normal S1 and S2.  No          

      gallops, murmurs, or rubs.  Normal PMI, no JVD.  No pulse deficits. Respiratory:  Lungs     

      have equal breath sounds bilaterally, clear to auscultation and percussion.  No rales,      

      rhonchi or wheezes noted.  No increased work of breathing, no retractions or nasal          

      flaring. Skin:  Warm, dry with normal turgor.  Normal color with no rashes, no lesions,     

      and no evidence of cellulitis. MS/ Extremity:  Pulses equal, no cyanosis.                   

      Neurovascular intact.  Full, normal range of motion.                                        

03:33 Abdomen/GI: Inspection: abdomen appears normal, Bowel sounds: normal, Palpation: mild       

      abdominal tenderness, in the left lower quadrant.                                           

                                                                                                  

Vital Signs:                                                                                      

03:20  / 70; Pulse 66; Resp 16 S; Pulse Ox 98% on R/A;                                  ha1 

03:30  / 70; Pulse 66; Resp 16; Temp 97.6; Pulse Ox 98% ; Weight 54.43 kg; Height 5 ft. vc1 

      0 in. (152.40 cm); Pain 10/10;                                                              

04:20  / 67; Pulse 62; Resp 14 S; Pulse Ox 97% on R/A;                                  ha1 

05:00  / 55; Pulse 60; Resp 14 S; Pulse Ox 97% on R/A;                                  ha1 

06:00  / 64; Pulse 62; Resp 14 S; Pulse Ox 98% on R/A;                                  ha1 

08:00 BP 94 / 59; Pulse 58; Resp 16; Pulse Ox 100% on R/A;                                    db  

08:42 BP 95 / 68; Pulse 68; Resp 16; Temp 98.2(O); Pulse Ox 99% ;                             ko1 

03:30 Body Mass Index 23.44 (54.43 kg, 152.40 cm)                                             vc1 

                                                                                                  

MDM:                                                                                              

03:33 Differential diagnosis: UTI, Ovarian torsion vs ovarian cyst.                           ms3 

03:37 Patient medically screened.                                                             ms3 

06:21 ED course: US without acute findings in the pelvis. Ovaries not seen. CBC normal. UA    ms3 

      not indicative of UTI. Patient LLQ abdomen with TTP. Will obtain CT abdomen and pelvis      

      with IV contrast..                                                                          

07:04 Transition of care: After a detail discussion of the patient's case, care is            ms3 

      transferred to Nick Tracey MD.                                                           

07:47 Data reviewed: vital signs, nurses notes, lab test result(s), radiologic studies, CT    [place] 

      scan. Consideration of Admission/Observation Patient was admitted/placed on                 

      observation. Escalation of care including admission/observation considered. Discussion      

      of test interpretation with radiology: I had a discussion with radiology regarding a        

      test interpretation. stone, 3 mm distal left with debris. Historians other than the         

      Patient: Spouse/Significant Other: sig other.                                               

                                                                                                  

                                                                                             

03:38 Order name: CBC with Diff; Complete Time: 04:11                                         ms3 

                                                                                             

03:38 Order name: CMP; Complete Time: 05:02                                                   ms3 

                                                                                             

03:51 Order name: Urine Dipstick-Ancillary; Complete Time: 04:11                              EDMS

                                                                                             

04:16 Order name: Urine Pregnancy--Ancillary (enter results); Complete Time: 05:02              

                                                                                             

08:26 Order name: Urine Culture                                                               [facility] 

                                                                                             

05:47 Order name: Transvaginal Study Probe                                                    EDMS

                                                                                             

06:18 Order name: CT Abd/Pelvis - IV Contrast Only; Complete Time: 07:46                      ms3 

                                                                                             

03:38 Order name: IV Saline Lock; Complete Time: 03:53                                        ms3 

                                                                                             

03:38 Order name: Labs collected and sent; Complete Time: 03:53                               ms3 

                                                                                             

03:38 Order name: Urine Dipstick-Ancillary (obtain specimen); Complete Time: 03:54            ms3 

                                                                                             

03:38 Order name: Urine Pregnancy Test (obtain specimen); Complete Time: 03:54                ms3 

                                                                                                  

Administered Medications:                                                                         

04:29 Drug: Ketorolac 10 mg 10 mg Route: IVP; Site: left antecubital;                         ha1 

05:00 Follow up: Response: No adverse reaction; Pain is decreased                             ha1 

07:58 Drug: Ketorolac 15 mg Route: IVP; Site: left antecubital;                               db  

08:40 Follow up: Response: No adverse reaction; Pain is decreased                             ko1 

07:58 Drug: NS 0.9% 1000 ml Route: IV; Rate: 1 bolus; Site: left antecubital;                 db  

08:40 Follow up: IV Status: Completed infusion; IV Intake: 1000ml                             ko1 

07:58 Drug: Rocephin (cefTRIAXone) 1 grams Route: IV; Rate: per protocol; Site: left          db  

      antecubital;                                                                                

08:30 Follow up: Response: No adverse reaction; IV Status: Completed infusion; IV Intake:     db  

      100ml                                                                                       

08:41 Follow up: Response: No adverse reaction; IV Status: Completed infusion; IV Intake: 50dbrh0 

07:58 Drug: Flomax (tamsulosin) 0.4 mg Route: PO;                                             db  

08:31 Follow up: Response: No adverse reaction                                                db  

08:41 Follow up: Response: No adverse reaction                                                ko1 

08:34 Drug: Cipro (ciprofloxacin) 500 mg Route: PO;                                           ko1 

08:41 Follow up: Response: No adverse reaction                                                ko1 

                                                                                                  

                                                                                                  

Disposition Summary:                                                                              

23 07:50                                                                                    

Discharge Ordered                                                                                 

      Location: Home                                                                          [place] 

      Problem: new                                                                            [place] 

      Symptoms: have improved                                                                 [facility] 

      Condition: Stable                                                                       [facility] 

      Diagnosis                                                                                   

        - Hydronephrosis with renal and ureteral calculous obstruction - 3 mm distal left     [facility] 

      Followup:                                                                               [facility] 

        - With: Private Physician                                                                  

        - When: 2 - 3 days                                                                         

        - Reason: Recheck today's complaints, Continuance of care, Re-evaluation by your           

      physician                                                                                   

      Followup:                                                                               [place] 

        - With: Jackson Winkler MD                                                                

        - When: 2 - 3 days                                                                         

        - Reason: Recheck today's complaints, Continuance of care, Re-evaluation by your           

      physician                                                                                   

      Discharge Instructions:                                                                     

        - Kidney Stones                                                                       [facility] 

        - Kidney Stones, Easy-to-Read                                                         [facility] 

        - Discharge Summary Sheet                                                             ha1 

        - Hydronephrosis                                                                      [facility] 

      Forms:                                                                                      

        - SBAR form                                                                           ha1 

        - Medication Reconciliation Form                                                      [facility] 

        - Thank You Letter                                                                    [facility] 

        - Antibiotic Education                                                                [facility] 

        - Prescription Opioid Use                                                             [facility] 

      Prescriptions:                                                                              

        - Zofran 4 mg Oral Tablet                                                                  

            - take 1 tablet by ORAL route every 12 hours As needed; 20 tablet; Refills: 0,    [facility] 

      Product Selection Permitted                                                                 

        - Flomax 0.4 mg Oral capsule                                                               

            - take 1 capsule by ORAL route once daily 1/2 hour following the same meal each   [facility] 

      day; 20 capsule; Refills: 0, Product Selection Permitted                                    

        - Cipro 500 mg Oral Tablet                                                                 

            - take 1 tablet by ORAL route every 12 hours for 7 days; 14 tablet; Refills: 0,   [facility] 

      Product Selection Permitted                                                                 

        - Tylenol-Codeine #3 300 mg-30 mg Oral                                                     

            - take 2 tablet by ORAL route every 6 hours; 20 tablet; Refills: 0, Product       [facility] 

      Selection Permitted                                                                         

Signatures:                                                                                       

Dispatcher [employer]                           Nick Lee MD MD cha Sims, Marcus, DO                        DO   ms3                                                  

Delaney Lino RN                    RN   vc1                                                  

Ramon, Dorothy, RN                        RN   ha1                                                  

Brittany Toscano, VICKEY                       RN   ko1                                                  

Felicity Bradford, RN                    RN   db                                                   

                                                                                                  

Corrections: (The following items were deleted from the chart)                                    

05:47 03:39 Pelvis Complete+US.RAD.ABEL ordered. EDMS                                          EDMS

                                                                                                  

**************************************************************************************************

## 2023-01-29 NOTE — RAD REPORT
EXAM DESCRIPTION:  US - Transvaginal Study Probe - 1/29/2023 5:49 am

 

CLINICAL HISTORY:  The patient is 47 years old and is Female; ABD PAIN

 

TECHNIQUE:  Real-time transabdominal and transvaginal pelvic ultrasound with image documentation.   T
ransvaginal imaging was used for better evaluation of the endometrium and adnexa.

 

COMPARISON:  11/15/2020 CT abdomen pelvis with contrast

 

FINDINGS:  UTERUS/CERVIX:    Nabothian cyst incidentally noted.

         No myometrial mass.

         The uterus measures 5.2 x 2.5 x 3.1 cm.

         The endometrial stripe measures 0.2 cm in thickness.

RIGHT OVARY:    Neither the left or right ovary is visualized on transabdominal or transvaginal imagi
ng.

LEFT OVARY:  See above.

FREE FLUID:    No free fluid. No abnormal adnexal masses.

BLADDER:  Appears unremarkable for level of decompression.

 

IMPRESSION:  1.   Nonvisualization of either ovary.

2.   No acute findings in the pelvis.

 

Electronically signed by:   Manuel Mcmullen MD   1/29/2023 5:58 AM CST Workstation: [id number]

 

 

Due to temporary technical issues with the PACS/Fluency reporting system, reports are being signed by
 the in house radiologists without review as a courtesy to insure prompt reporting. The interpreting 
radiologist is fully responsible for the content of the report.

## 2023-01-29 NOTE — ER
Nurse's Notes                                                                                     

 [facility]                                                                 

Name: Slime Ortega                                                                             

Age: 47 yrs                                                                                       

Sex: Female                                                                                       

: 1975                                                                                   

MRN: [id number]                                                                                   

Arrival Date: 2023                                                                          

Time: 03:18                                                                                       

Account#: [id number]                                                                            

Bed 18                                                                                            

Private MD:                                                                                       

Diagnosis: Hydronephrosis with renal and ureteral calculous obstruction-3 mm distal left          

                                                                                                  

Presentation:                                                                                     

                                                                                             

03:30 Chief complaint: Patient states: "I'm having sharp pains in my lower stomach that moves vc1 

      to my back. I am going more often and only a little at a time.". Coronavirus screen:        

      Vaccine status: Patient reports being unvaccinated. Client denies travel out of the         

      [place] in the last 14 days. At this time, the client does not indicate any symptoms           

      associated with coronavirus-19. Ebola Screen: No symptoms or risks identified at this       

      time. Initial Sepsis Screen: Does the patient meet any 2 criteria? No. Patient's            

      initial sepsis screen is negative. Does the patient have a suspected source of              

      infection? No. Patient's initial sepsis screen is negative. Initial Sepsis Screen:.         

      Initial Sepsis Screen: Does the patient meet any 2 criteria? No. Patient's initial          

      sepsis screen is negative. Does the patient have a suspected source of infection?.          

      Initial Sepsis Screen: Does the patient meet any 2 criteria? No. Patient's initial          

      sepsis screen is negative. Does the patient have a suspected source of infection? No.       

      Patient's initial sepsis screen is negative. Risk Assessment: Do you want to hurt           

      yourself or someone else?. Onset of symptoms was 2023.                          

03:30 Method Of Arrival: Ambulatory                                                           vc1 

03:30 Acuity: RENEE 4                                                                           vc1 

                                                                                                  

Triage Assessment:                                                                                

03:35 General: Appears in no apparent distress. uncomfortable, Behavior is calm, cooperative, vc1 

      appropriate for age. Pain: Complains of pain in suprapubic area and left lower quadrant     

      Pain radiates to back Pain currently is 9 out of 10 on a pain scale. EENT: No deficits      

      noted. No signs and/or symptoms were reported regarding the EENT system. Neuro: Level       

      of Consciousness is awake, alert, obeys commands. Cardiovascular: No deficits noted.        

      Respiratory: Airway is patent Respiratory effort is even, unlabored, Respiratory            

      pattern is regular, symmetrical. GI: No deficits noted. No signs and/or symptoms were       

      reported involving the gastrointestinal system. : Reports cramping, pain in left in       

      suprapubic area lower quadrant(s) in lower back urgency, urinary frequency. Derm: No        

      deficits noted. No signs and/or symptoms reported regarding the dermatologic system.        

      Musculoskeletal: No deficits noted. No signs and/or symptoms reported regarding the         

      musculoskeletal system.                                                                     

                                                                                                  

OB/GYN:                                                                                           

03:34 LMP N/A - Post-menopause                                                                vc1 

                                                                                                  

Historical:                                                                                       

- Allergies:                                                                                      

03:34 No Known Allergies;                                                                     vc1 

- Home Meds:                                                                                      

03:34 None [Active];                                                                          vc1 

- PMHx:                                                                                           

03:34 None;                                                                                   vc1 

- PSHx:                                                                                           

03:34 None;                                                                                   vc1 

                                                                                                  

- Immunization history:: Client reports having NOT received the Covid vaccine.                    

- Social history:: Smoking status: Patient denies any tobacco usage or history of.                

                                                                                                  

                                                                                                  

Screenin:35 Abuse screen: Denies threats or abuse. Nutritional screening: No deficits noted.        vc1 

      Tuberculosis screening: No symptoms or risk factors identified.                             

03:35 [facility] ED Fall Risk Assessment (Adult) History of falling in the last 3 months,       vc1 

      including since admission No falls in past 3 months (0 pts) Confusion or Disorientation     

      No (0 pts) Intoxicated or Sedated No (0 pts) Impaired Gait No (0 pts) Mobility Assist       

      Device Used No (0 pt) Altered Elimination No (0 pt) Score/Fall Risk Level 0 - 2 = Low       

      Risk Oriented to surroundings, Maintained a safe environment, Educated pt \T\ family on     

      fall prevention, incl call for assistance when getting out of bed.                          

                                                                                                  

Assessment:                                                                                       

03:20 General: Appears comfortable, Behavior is calm, cooperative. Pain: Complains of pain in ha1 

      suprapubic area Pain radiates to back Quality of pain is described as burning,              

      throbbing, Pain began suddenly. Neuro: Level of Consciousness is awake, alert, obeys        

      commands, Oriented to person, place, time, situation. Cardiovascular: Heart tones S1 S2     

      present Capillary refill < 3 seconds Patient's skin is warm and dry. Respiratory:           

      Airway is patent Respiratory effort is even, unlabored, Respiratory pattern is regular,     

      symmetrical. GI: No signs and/or symptoms were reported involving the gastrointestinal      

      system. Abdomen is flat, non-distended. : Urine is clear, Reports burning with            

      urination, since yesterday afternoon. EENT: No deficits noted. No signs and/or symptoms     

      were reported regarding the EENT system. Derm: Skin is pink, warm \T\ dry.                  

      Musculoskeletal: Circulation, motion, and sensation intact. Range of motion: intact in      

      all extremities.                                                                            

04:20 Reassessment: Patient and/or family updated on plan of care and expected duration. Pain ha1 

      level reassessed. Patient is alert, oriented x 3, equal unlabored respirations, skin        

      warm/dry/pink.                                                                              

05:00 Reassessment: Patient and/or family updated on plan of care and expected duration. Pain ha1 

      level reassessed. Patient is alert, oriented x 3, equal unlabored respirations, skin        

      warm/dry/pink. Patient denies pain at this time. Patient states feeling better. Patient     

      states symptoms have improved.                                                              

06:00 Reassessment: Patient and/or family updated on plan of care and expected duration. Pain ha1 

      level reassessed. Patient is alert, oriented x 3, equal unlabored respirations, skin        

      warm/dry/pink. Patient denies pain at this time. Patient states feeling better.             

06:49 Reassessment: Patient and/or family updated on plan of care and expected duration. Pain ha1 

      level reassessed. Patient is alert, oriented x 3, equal unlabored respirations, skin        

      warm/dry/pink. back from CT.                                                                

08:06 Reassessment: Patient appears in no apparent distress at this time. Patient and/or      db  

      family updated on plan of care and expected duration. Pain level reassessed. Patient is     

      alert, oriented x 3, equal unlabored respirations, skin warm/dry/pink. discharged           

      pending fluids to be completed. Pain: Complains of pain in abdomen and left lower           

      quadrant.                                                                                   

                                                                                                  

Vital Signs:                                                                                      

03:20  / 70; Pulse 66; Resp 16 S; Pulse Ox 98% on R/A;                                  ha1 

03:30  / 70; Pulse 66; Resp 16; Temp 97.6; Pulse Ox 98% ; Weight 54.43 kg; Height 5 ft. vc1 

      0 in. (152.40 cm); Pain 10/10;                                                              

04:20  / 67; Pulse 62; Resp 14 S; Pulse Ox 97% on R/A;                                  ha1 

05:00  / 55; Pulse 60; Resp 14 S; Pulse Ox 97% on R/A;                                  ha1 

06:00  / 64; Pulse 62; Resp 14 S; Pulse Ox 98% on R/A;                                  ha1 

08:00 BP 94 / 59; Pulse 58; Resp 16; Pulse Ox 100% on R/A;                                    db  

08:42 BP 95 / 68; Pulse 68; Resp 16; Temp 98.2(O); Pulse Ox 99% ;                             ko1 

03:30 Body Mass Index 23.44 (54.43 kg, 152.40 cm)                                             vc1 

                                                                                                  

ED Course:                                                                                        

03:18 Patient arrived in ED.                                                                  ja2 

03:21 Bryan Padron DO is Attending Physician.                                                ms3 

03:34 Triage completed.                                                                       vc1 

03:35 Arm band placed on left wrist.                                                          vc1 

03:36 Dorothy Ramon, RN is Primary Nurse.                                                      ha1 

03:37 Patient has correct armband on for positive identification. Bed in low position. Pulse  vc1 

      ox on. NIBP on.                                                                             

03:40 Inserted saline lock: 20 gauge in left antecubital area, using aseptic technique. Blood ha1 

      collected.                                                                                  

05:51 Transvaginal Study Probe In Process Unspecified.                                        EDMS

06:56 CT Abd/Pelvis - IV Contrast Only In Process Unspecified.                                EDMS

07:02 Attending Physician role handed off by Bryan Padron DO                                 giancarlo 

07:02 Nick Tracey MD is Attending Physician.                                             giancarlo 

07:49 Jackson Winkler MD is Referral Physician.                                              giancarlo 

08:17 No provider procedures requiring assistance completed.                                  ko1 

08:32 Urine Culture Sent.                                                                     ko1 

08:42 IV discontinued, intact, bleeding controlled, No redness/swelling at site. Pressure     ko1 

      dressing applied.                                                                           

                                                                                                  

Administered Medications:                                                                         

04:29 Drug: Ketorolac 10 mg 10 mg Route: IVP; Site: left antecubital;                         ha1 

05:00 Follow up: Response: No adverse reaction; Pain is decreased                             ha1 

07:58 Drug: Ketorolac 15 mg Route: IVP; Site: left antecubital;                               db  

08:40 Follow up: Response: No adverse reaction; Pain is decreased                             ko1 

07:58 Drug: NS 0.9% 1000 ml Route: IV; Rate: 1 bolus; Site: left antecubital;                 db  

08:40 Follow up: IV Status: Completed infusion; IV Intake: 1000ml                             ko1 

07:58 Drug: Rocephin (cefTRIAXone) 1 grams Route: IV; Rate: per protocol; Site: left          db  

      antecubital;                                                                                

08:30 Follow up: Response: No adverse reaction; IV Status: Completed infusion; IV Intake:     db  

      100ml                                                                                       

08:41 Follow up: Response: No adverse reaction; IV Status: Completed infusion; IV Intake: [id number] 

07:58 Drug: Flomax (tamsulosin) 0.4 mg Route: PO;                                             db  

08:31 Follow up: Response: No adverse reaction                                                db  

08:41 Follow up: Response: No adverse reaction                                                ko1 

08:34 Drug: Cipro (ciprofloxacin) 500 mg Route: PO;                                           ko1 

08:41 Follow up: Response: No adverse reaction                                                ko1 

                                                                                                  

                                                                                                  

Medication:                                                                                       

03:35 VIS not applicable for this client.                                                     vc1 

                                                                                                  

Intake:                                                                                           

08:30 IV: 100ml; Total: 100ml.                                                                db  

08:40 IV: 1000ml; Total: 1100ml.                                                              ko1 

08:41 IV: 50ml; Total: 1150ml.                                                                ko1 

                                                                                                  

Outcome:                                                                                          

07:50 Discharge ordered by MD.                                                                giancarlo 

08:42 Discharged to home ambulatory.                                                          ko1 

08:42 Condition: improved                                                                         

08:42 Discharge instructions given to patient, Instructed on discharge instructions, follow       

      up and referral plans. medication usage, Demonstrated understanding of instructions,        

      follow-up care, medications, Prescriptions given X 4.                                       

08:43 Patient left the ED.                                                                    ko1 

                                                                                                  

Addendum:                                                                                         

2023                                                                                        

     09:29 Addendum: Culture Results: Positive urine culture. No further action required. Bacteria a
a5

           sensitive to prescribed antibiotic.                                                    

                                                                                                  

Signatures:                                                                                       

Dispatcher [employer]                           EDMS                                                 

Nick Tracey MD MD cha Calderon, Audri, RN                     RN   aa5                                                  

Bryan Padron DO DO   ms3                                                  

Clary Christine                           ja2                                                  

Delaney Lino RN                    RN   1                                                  

Dorothy Ramon RN                        RN   ha1                                                  

Brittany Toscano RN                       RN   ko1                                                  

Felicity Bradford RN                    RN   db                                                   

                                                                                                  

************************************************************************************************** None known